# Patient Record
Sex: MALE | Race: WHITE | Employment: FULL TIME | URBAN - METROPOLITAN AREA
[De-identification: names, ages, dates, MRNs, and addresses within clinical notes are randomized per-mention and may not be internally consistent; named-entity substitution may affect disease eponyms.]

---

## 2022-11-12 NOTE — PROGRESS NOTES
[facility]  [address], [facility] [place] [address]  Phone: [phone]      Name:  Jasmyne Hughes  :    1985    Consultants:   Patient Care Team:  Araseli Duke MD as PCP - General (Family Medicine)    Chief Complaint:     Jasmyne Hughes is a 40 y.o. male who presents today for a New Patient care visit with Personalized Prevention Plan Services as noted below. HPI:     Jasmyne Hughes is a 40 y.o. male who presents today to establish care. Change in Hearing in Left Ear  - Past 5-10 yrs: left ear is \"getting worse,\" sounds like it's in a wind tunnel  - Starting to impact communication: unable to tell how loud he is talking. 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    Anxiety  - Patient reports his anxiety started in  she has managed somewhat with working out  - Has gotten worse recently over the last 9 months with his new job, works at [employer] as   - CYNTHIA-7: 17  - Patient reports nearly every day feeling nervous, anxious, or on edge; not able to control or stop his worrying; worrying too much about different things  - Patient reports more than half the days he has trouble relaxing and has been so restless that it is hard to sit still  - Several days he feels irritable but nothing like yelling at his wife, just notices that he gets annoyed easily especially with his hearing and having to have things repeated  - Patient also reports a \"sense of impending doom\" comes in waves, not a chronic daily thing.   He feels like this is especially since he lost 2 family members within 6 months of each other due to being sick with old age and now he is concerned about what is going to happen and who is possibly next  - Patient also reports worry about the state of the world, having kids, stress at work and room to move up in his line of work  - His work does offer virtual counseling that he has felt is \"okay\" but interested in other options  - Patient reports hesitancy toward medications  Reports of ADHD?  - Reports of some history of trouble with concentration, thinks it sometimes contributes to his anxious thought process  - Didn't like how medication made him feel and not interested in any now    Depressive Symptoms  - PHQ-2: 1  - PHQ-9: 8  - Notably the patient's positive scores on the depression screen are for feeling tired in addition to feeling bad about himself that he is a failure to let down to his family where he is at his age and feels like he is behind. He went to college at age 25 and had to have career shifts due to layoff because of COVID  - He finds great support in his wife    Substance Use  - Tobacco use: 1 ppd 19 yrs, and quit on 2 yrs  - EtOH: 3-5 drinks a week if goes out  - [facility]  prn: dispensary with card. Uses once monthly  - Denies other drugs    Healthcare Maintenance  Last labs in 2016, no abnormalities: due for lipid panel due to age. - Flu vaccine  - Covid Vaccine  - Tdap vaccine? - Varicella vaccine?   - HIV and Hep C screen due    Patient Active Problem List   Diagnosis    Anxiety    Symptoms of depression    Change in hearing of left ear    Marijuana use    Former smoker       Past Medical History:    No past medical history on file. Past Surgical History:  Past Surgical History:   Procedure Laterality Date    APPENDECTOMY         Home Meds:  Prior to Visit Medications    Medication Sig Taking? Authorizing Provider   escitalopram (LEXAPRO) 10 MG tablet Take 1 tablet by mouth daily Yes Marlon Sexton MD   hydrOXYzine HCl (ATARAX) 50 MG tablet Take 1 tablet by mouth every 8 hours as needed for Anxiety Yes Marlon Sexton MD   naproxen (NAPROSYN) 500 MG tablet Take 1 tablet by mouth every 12 hours as needed for Pain.   Patient not taking: Reported on 11/14/2022  Abbe Patel PA-C Allergies:    Patient has no known allergies. Family History:   No family history on file. Social History:  Social History       Tobacco History       Smoking Status  Every Day Smoking Frequency  1.00 packs/day Smoking Tobacco Type  Cigarettes      Smokeless Tobacco Use  Unknown              Alcohol History       Alcohol Use Status  Yes Drinks/Week  5 Cans of beer per week Amount  5.0 standard drinks of alcohol/wk Comment  daily              Drug Use       Drug Use Status  Yes Types  Marijuana [place])              Sexual Activity       Sexually Active  Not Asked                       Health Maintenance Completed:  Health Maintenance   Topic Date Due    Varicella vaccine (1 of 2 - 2-dose childhood series) Never done    DTaP/Tdap/Td vaccine (1 - Tdap) Never done    COVID-19 Vaccine (3 - Booster for Pfizer series) 06/26/2021    Flu vaccine (1) Never done    HIV screen  11/14/2023 (Originally 11/11/2000)    Hepatitis C screen  11/15/2023 (Originally 11/11/2003)    Depression Screen  11/14/2023    Hepatitis A vaccine  Aged Out    Hib vaccine  Aged Out    Meningococcal (ACWY) vaccine  Aged Out    Pneumococcal 0-64 years Vaccine  Aged [employer] History   Administered Date(s) Administered    COVID-19, PFIZER PURPLE top, DILUTE for use, (age 15 y+), 30mcg/0.3mL 04/03/2021, 05/01/2021         Review of Systems:  Review of Systems   Constitutional:  Negative for chills and fever. HENT:  Negative for congestion and sore throat. Respiratory:  Negative for cough and shortness of breath. Cardiovascular:  Negative for chest pain and palpitations. Gastrointestinal:  Negative for abdominal pain, constipation, diarrhea, nausea and vomiting. Genitourinary:  Negative for difficulty urinating and dysuria. Neurological:  Negative for dizziness, light-headedness and headaches. Psychiatric/Behavioral:  Positive for dysphoric mood. Negative for suicidal ideas. The patient is nervous/anxious. Physical Exam:   Vitals:    11/14/22 0920   BP: 134/78   Site: Left Upper Arm   Position: Sitting   Cuff Size: Small Adult   Pulse: 62   Temp: 97.1 °F (36.2 °C)   TempSrc: Temporal   SpO2: 99%   Weight: 186 lb (84.4 kg)   Height: 5' 10.51\" (1.791 m)     Body mass index is 26.3 kg/m². Wt Readings from Last 3 Encounters:   11/14/22 186 lb (84.4 kg)       BP Readings from Last 3 Encounters:   11/14/22 134/78       Physical Exam  Constitutional:       General: He is not in acute distress. HENT:      Head: Normocephalic and atraumatic. Right Ear: Tympanic membrane normal.      Left Ear: Tympanic membrane normal.      Nose: Nose normal.      Mouth/Throat:      Mouth: Mucous membranes are moist.      Pharynx: Oropharynx is clear. Eyes:      Extraocular Movements: Extraocular movements intact. Conjunctiva/sclera: Conjunctivae normal.      Pupils: Pupils are equal, round, and reactive to light. Cardiovascular:      Rate and Rhythm: Normal rate and regular rhythm. Pulses: Normal pulses. Heart sounds: Normal heart sounds. No murmur heard. No friction rub. No gallop. Pulmonary:      Effort: Pulmonary effort is normal. No respiratory distress. Breath sounds: Normal breath sounds. No wheezing, rhonchi or rales. Abdominal:      General: There is no distension. Palpations: Abdomen is soft. Tenderness: There is no abdominal tenderness. There is no guarding. Hernia: No hernia is present. Musculoskeletal:         General: Normal range of motion. Cervical back: Normal range of motion. Skin:     General: Skin is warm and dry. Neurological:      General: No focal deficit present. Mental Status: He is alert and oriented to person, place, and time. Psychiatric:         Mood and Affect: Mood is anxious and depressed. Lab Review:   No visits with results within 2 Month(s) from this visit.    Latest known visit with results is:   No results found for any previous visit. Assessment/Plan:  Phuong Ibarra is a 40 y.o. male who presents today to establish care. Encounter to establish care  Patient's medications, allergies, past medical, surgical, social and family histories were reviewed and updated as appropriate. Anxiety  - Not at goal  - Counseled patients on options for medications to help with symptoms and advised that these may not be long-term medications. - Will start escitalopram 10 mg daily, advised on side effects that may last for 2 weeks before resolution  - Will use hydroxyzine 50 mg tablet prn, advised of possible drowsiness on medication, may titrate and use 1/2 tablet prn  - Will refer patient to behavioral health  - Follow-up in 4-6 weeks to assess effectiveness of medication  -     escitalopram (LEXAPRO) 10 MG tablet; Take 1 tablet by mouth daily  -     hydrOXYzine HCl (ATARAX) 50 MG tablet; Take 1 tablet by mouth every 8 hours as needed for Anxiety  -     External Referral To Behavioral Health    Symptoms of depression  - Not at goal  - As above, will start escitalopram 10 mg  - Will refer patient to behavioral health  - Follow-up in 4-6 weeks to assess effectiveness of medication  -     escitalopram (LEXAPRO) 10 MG tablet; Take 1 tablet by mouth daily  -     External Referral To [address] in hearing of left ear  - Not at goal  - Will refer patient to ENT for further work-up  -     Srinivas Cade MD, Otolaryngology, [facility]    Marijuana use  - Medical marijuana card  - Counseled pt on risks associated with marijuana use including adverse interactions with other medications and worsening health conditions up to and including death.  - Encouraged cessation.   - Pt verbalizes understanding, aware of risks of persistent marijuana usage.     Former smoker  - Congratulated patient on continued cessation from tobacco    Refused influenza vaccine  I counseled parent about the influenza vaccine, including effectiveness, side effects, and the diseases they prevent. They had the opportunity to ask questions and decided to not get vaccinated. Healthcare maintenance  - Will consider vaccines at follow-up: Tdap and Varicella  - Covid booster eligible, unsure if he wants to get it  -     Comprehensive Metabolic Panel  -     CBC with Auto Differential  -     Lipid Panel        Health Maintenance Due:  Health Maintenance Due   Topic Date Due    Varicella vaccine (1 of 2 - 2-dose childhood series) Never done    DTaP/Tdap/Td vaccine (1 - Tdap) Never done    COVID-19 Vaccine (3 - Booster for Pfizer series) 06/26/2021    Flu vaccine (1) Never done        Health care decision maker:  <72years old      Health Maintenance: (USPSTF Recommendations)  CRC/Colonoscopy Screening: (adults 45-49 (B), 50-75 (A)): HIV Screen: (16-65 yr old, and all pregnant patients (A)): Hep C Screen: (18-79 yr old (B)):  HCC Screen: (all pts with cirrhosis and high risk Hep B (US q6 mo)):  Immunizations: [unfilled]      RTC:  Return in about 1 month (around 12/14/2022) for follow-up, healthcare maintenance andf vaccines. EMR Dragon/transcription disclaimer:  Much of this encounter note is electronic transcription/translation of spoken language to printed texts. The electronic translation of spoken language may be erroneous, or at times, nonsensical words or phrases may be inadvertently transcribed.   Although I have reviewed the note for such errors, some may still exist.     Gifty Wu MD  PGY-2  11/14/2022

## 2022-11-14 ENCOUNTER — OFFICE VISIT (OUTPATIENT)
Dept: PRIMARY CARE CLINIC | Age: 37
End: 2022-11-14
Payer: COMMERCIAL

## 2022-11-14 ENCOUNTER — HOSPITAL ENCOUNTER (OUTPATIENT)
Age: 37
Discharge: HOME | End: 2022-11-14
Payer: COMMERCIAL

## 2022-11-14 VITALS
DIASTOLIC BLOOD PRESSURE: 78 MMHG | TEMPERATURE: 97.1 F | HEIGHT: 71 IN | WEIGHT: 186 LBS | HEART RATE: 62 BPM | SYSTOLIC BLOOD PRESSURE: 134 MMHG | OXYGEN SATURATION: 99 % | BODY MASS INDEX: 26.04 KG/M2

## 2022-11-14 DIAGNOSIS — F41.9 ANXIETY: ICD-10-CM

## 2022-11-14 DIAGNOSIS — R45.89 SYMPTOMS OF DEPRESSION: ICD-10-CM

## 2022-11-14 DIAGNOSIS — Z28.21 REFUSED INFLUENZA VACCINE: ICD-10-CM

## 2022-11-14 DIAGNOSIS — H91.92 CHANGE IN HEARING OF LEFT EAR: ICD-10-CM

## 2022-11-14 DIAGNOSIS — Z87.891 FORMER SMOKER: ICD-10-CM

## 2022-11-14 DIAGNOSIS — Z76.89 ENCOUNTER TO ESTABLISH CARE: Primary | ICD-10-CM

## 2022-11-14 DIAGNOSIS — F12.90 MARIJUANA USE: ICD-10-CM

## 2022-11-14 DIAGNOSIS — Z00.00 HEALTHCARE MAINTENANCE: ICD-10-CM

## 2022-11-14 LAB
A/G RATIO: 2.6 (ref 1.1–2.2)
ALBUMIN SERPL-MCNC: 4.9 G/DL (ref 3.4–5)
ALP BLD-CCNC: 74 U/L (ref 40–129)
ALT SERPL-CCNC: 26 U/L (ref 10–40)
ANION GAP SERPL CALCULATED.3IONS-SCNC: 10 MMOL/L (ref 3–16)
AST SERPL-CCNC: 20 U/L (ref 15–37)
BASOPHILS ABSOLUTE: 0 K/UL (ref 0–0.2)
BASOPHILS RELATIVE PERCENT: 0.5 %
BILIRUB SERPL-MCNC: 0.5 MG/DL (ref 0–1)
BUN BLDV-MCNC: 10 MG/DL (ref 7–20)
CALCIUM SERPL-MCNC: 9.8 MG/DL (ref 8.3–10.6)
CHLORIDE BLD-SCNC: 102 MMOL/L (ref 99–110)
CHOLESTEROL, TOTAL: 207 MG/DL (ref 0–199)
CO2: 26 MMOL/L (ref 21–32)
CREAT SERPL-MCNC: 0.8 MG/DL (ref 0.9–1.3)
EOSINOPHILS ABSOLUTE: 0 K/UL (ref 0–0.6)
EOSINOPHILS RELATIVE PERCENT: 1.1 %
GFR SERPL CREATININE-BSD FRML MDRD: >60 ML/MIN/{1.73_M2}
GLUCOSE BLD-MCNC: 83 MG/DL (ref 70–99)
HCT VFR BLD CALC: 38.7 % (ref 40.5–52.5)
HDLC SERPL-MCNC: 70 MG/DL (ref 40–60)
HEMOGLOBIN: 13.8 G/DL (ref 13.5–17.5)
LDL CHOLESTEROL CALCULATED: 126 MG/DL
LYMPHOCYTES ABSOLUTE: 0.9 K/UL (ref 1–5.1)
LYMPHOCYTES RELATIVE PERCENT: 22 %
MCH RBC QN AUTO: 31.6 PG (ref 26–34)
MCHC RBC AUTO-ENTMCNC: 35.8 G/DL (ref 31–36)
MCV RBC AUTO: 88.4 FL (ref 80–100)
MONOCYTES ABSOLUTE: 0.4 K/UL (ref 0–1.3)
MONOCYTES RELATIVE PERCENT: 8.3 %
NEUTROPHILS ABSOLUTE: 2.9 K/UL (ref 1.7–7.7)
NEUTROPHILS RELATIVE PERCENT: 68.1 %
PDW BLD-RTO: 12.7 % (ref 12.4–15.4)
PLATELET # BLD: 232 K/UL (ref 135–450)
PMV BLD AUTO: 6.9 FL (ref 5–10.5)
POTASSIUM SERPL-SCNC: 4.8 MMOL/L (ref 3.5–5.1)
RBC # BLD: 4.38 M/UL (ref 4.2–5.9)
SODIUM BLD-SCNC: 138 MMOL/L (ref 136–145)
TOTAL PROTEIN: 6.8 G/DL (ref 6.4–8.2)
TRIGL SERPL-MCNC: 56 MG/DL (ref 0–150)
VLDLC SERPL CALC-MCNC: 11 MG/DL
WBC # BLD: 4.2 K/UL (ref 4–11)

## 2022-11-14 PROCEDURE — 85025 COMPLETE CBC W/AUTO DIFF WBC: CPT

## 2022-11-14 PROCEDURE — 36415 COLL VENOUS BLD VENIPUNCTURE: CPT

## 2022-11-14 PROCEDURE — 80061 LIPID PANEL: CPT

## 2022-11-14 PROCEDURE — 99204 OFFICE O/P NEW MOD 45 MIN: CPT

## 2022-11-14 PROCEDURE — 80053 COMPREHEN METABOLIC PANEL: CPT

## 2022-11-14 RX ORDER — ESCITALOPRAM OXALATE 10 MG/1
10 TABLET ORAL DAILY
Qty: 90 TABLET | Refills: 1 | Status: SHIPPED | OUTPATIENT
Start: 2022-11-14

## 2022-11-14 RX ORDER — HYDROXYZINE 50 MG/1
50 TABLET, FILM COATED ORAL EVERY 8 HOURS PRN
Qty: 90 TABLET | Refills: 0 | Status: SHIPPED | OUTPATIENT
Start: 2022-11-14 | End: 2022-12-14

## 2022-11-14 SDOH — ECONOMIC STABILITY: FOOD INSECURITY: WITHIN THE PAST 12 MONTHS, THE FOOD YOU BOUGHT JUST DIDN'T LAST AND YOU DIDN'T HAVE MONEY TO GET MORE.: NEVER TRUE

## 2022-11-14 SDOH — ECONOMIC STABILITY: FOOD INSECURITY: WITHIN THE PAST 12 MONTHS, YOU WORRIED THAT YOUR FOOD WOULD RUN OUT BEFORE YOU GOT MONEY TO BUY MORE.: NEVER TRUE

## 2022-11-14 ASSESSMENT — PATIENT HEALTH QUESTIONNAIRE - PHQ9
4. FEELING TIRED OR HAVING LITTLE ENERGY: 3
1. LITTLE INTEREST OR PLEASURE IN DOING THINGS: 0
SUM OF ALL RESPONSES TO PHQ QUESTIONS 1-9: 8
7. TROUBLE CONCENTRATING ON THINGS, SUCH AS READING THE NEWSPAPER OR WATCHING TELEVISION: 0
6. FEELING BAD ABOUT YOURSELF - OR THAT YOU ARE A FAILURE OR HAVE LET YOURSELF OR YOUR FAMILY DOWN: 3
2. FEELING DOWN, DEPRESSED OR HOPELESS: 1
SUM OF ALL RESPONSES TO PHQ QUESTIONS 1-9: 8
8. MOVING OR SPEAKING SO SLOWLY THAT OTHER PEOPLE COULD HAVE NOTICED. OR THE OPPOSITE, BEING SO FIGETY OR RESTLESS THAT YOU HAVE BEEN MOVING AROUND A LOT MORE THAN USUAL: 0
5. POOR APPETITE OR OVEREATING: 0
10. IF YOU CHECKED OFF ANY PROBLEMS, HOW DIFFICULT HAVE THESE PROBLEMS MADE IT FOR YOU TO DO YOUR WORK, TAKE CARE OF THINGS AT HOME, OR GET ALONG WITH OTHER PEOPLE: 1
SUM OF ALL RESPONSES TO PHQ QUESTIONS 1-9: 8
SUM OF ALL RESPONSES TO PHQ9 QUESTIONS 1 & 2: 1
SUM OF ALL RESPONSES TO PHQ QUESTIONS 1-9: 8
3. TROUBLE FALLING OR STAYING ASLEEP: 1
9. THOUGHTS THAT YOU WOULD BE BETTER OFF DEAD, OR OF HURTING YOURSELF: 0

## 2022-11-14 ASSESSMENT — ANXIETY QUESTIONNAIRES
7. FEELING AFRAID AS IF SOMETHING AWFUL MIGHT HAPPEN: 3
1. FEELING NERVOUS, ANXIOUS, OR ON EDGE: 3
2. NOT BEING ABLE TO STOP OR CONTROL WORRYING: 3
6. BECOMING EASILY ANNOYED OR IRRITABLE: 1
3. WORRYING TOO MUCH ABOUT DIFFERENT THINGS: 3
5. BEING SO RESTLESS THAT IT IS HARD TO SIT STILL: 2
4. TROUBLE RELAXING: 2
IF YOU CHECKED OFF ANY PROBLEMS ON THIS QUESTIONNAIRE, HOW DIFFICULT HAVE THESE PROBLEMS MADE IT FOR YOU TO DO YOUR WORK, TAKE CARE OF THINGS AT HOME, OR GET ALONG WITH OTHER PEOPLE: VERY DIFFICULT
GAD7 TOTAL SCORE: 17

## 2022-11-14 ASSESSMENT — SOCIAL DETERMINANTS OF HEALTH (SDOH): HOW HARD IS IT FOR YOU TO PAY FOR THE VERY BASICS LIKE FOOD, HOUSING, MEDICAL CARE, AND HEATING?: SOMEWHAT HARD

## 2022-11-15 PROBLEM — H91.92 CHANGE IN HEARING OF LEFT EAR: Status: ACTIVE | Noted: 2022-11-15

## 2022-11-15 PROBLEM — F12.90 MARIJUANA USE: Status: ACTIVE | Noted: 2022-11-15

## 2022-11-15 PROBLEM — R45.89 SYMPTOMS OF DEPRESSION: Status: ACTIVE | Noted: 2022-11-15

## 2022-11-15 PROBLEM — Z87.891 FORMER SMOKER: Status: ACTIVE | Noted: 2022-11-15

## 2022-11-15 ASSESSMENT — ENCOUNTER SYMPTOMS
SORE THROAT: 0
SHORTNESS OF BREATH: 0
DIARRHEA: 0
ABDOMINAL PAIN: 0
VOMITING: 0
COUGH: 0
CONSTIPATION: 0
NAUSEA: 0

## 2022-12-28 ENCOUNTER — PROCEDURE VISIT (OUTPATIENT)
Dept: AUDIOLOGY | Age: 37
End: 2022-12-28
Payer: COMMERCIAL

## 2022-12-28 ENCOUNTER — OFFICE VISIT (OUTPATIENT)
Dept: ENT CLINIC | Age: 37
End: 2022-12-28
Payer: COMMERCIAL

## 2022-12-28 VITALS
SYSTOLIC BLOOD PRESSURE: 105 MMHG | HEART RATE: 55 BPM | HEIGHT: 72 IN | TEMPERATURE: 97.6 F | WEIGHT: 185 LBS | RESPIRATION RATE: 16 BRPM | BODY MASS INDEX: 25.06 KG/M2 | DIASTOLIC BLOOD PRESSURE: 72 MMHG

## 2022-12-28 DIAGNOSIS — H83.3X2 NOISE-INDUCED HEARING LOSS OF LEFT EAR WITH RESTRICTED HEARING OF RIGHT EAR: Primary | ICD-10-CM

## 2022-12-28 DIAGNOSIS — H90.3 SENSORINEURAL HEARING LOSS, BILATERAL: Primary | ICD-10-CM

## 2022-12-28 DIAGNOSIS — H93.13 TINNITUS, BILATERAL: ICD-10-CM

## 2022-12-28 DIAGNOSIS — H91.90 DECREASED HEARING, UNSPECIFIED LATERALITY: Primary | ICD-10-CM

## 2022-12-28 DIAGNOSIS — R42 VERTIGO: ICD-10-CM

## 2022-12-28 PROCEDURE — 92557 COMPREHENSIVE HEARING TEST: CPT | Performed by: AUDIOLOGIST

## 2022-12-28 PROCEDURE — 92567 TYMPANOMETRY: CPT | Performed by: AUDIOLOGIST

## 2022-12-28 PROCEDURE — 99204 OFFICE O/P NEW MOD 45 MIN: CPT | Performed by: OTOLARYNGOLOGY

## 2022-12-28 ASSESSMENT — ENCOUNTER SYMPTOMS
SHORTNESS OF BREATH: 0
ABDOMINAL PAIN: 0
WHEEZING: 0

## 2022-12-28 NOTE — PATIENT INSTRUCTIONS
-Notify ENT with any further vertigo or room spinning - no overt nystagmus (eye movements) with balance testing today   -Recommend hearing test yearly - can consider MRI to rule out lesions/masses of the brainstem or nerves of hearing or balance

## 2022-12-28 NOTE — PATIENT INSTRUCTIONS
Good Communication Strategies    Communication can be challenging for anyone, but can be especially difficult for those with some degree of hearing loss. While we may not be able to control every factor that may lead to difficulty with communication, there are Good Communication Strategies that we can all use in our day-to-day lives. Communication takes both parties working together for it to be successful. Tips as a Listener:   Control your environment. It is important to limit the amount of background noise in the room when possible. You should also consider having a good light source in the room to best see the other person. Ask for clarification. Instead of saying \"What?\", you can use parts of what you heard to make a new question. For example, if you heard the word \"Thursday\" but not the rest of the week, you may ask \"What was that about Thursday? \" or \"What did you want to do Thursday? \". This shows the person talking that you are listening and will help them better explain what they are saying. Be an advocate for yourself. If you are hearing but not understanding, tell the other person \"I can hear you, but I need you to slow down when you speak. \"  Or if someone is facing the other direction, say \"I cannot hear you when you are not looking at me when we talk. \"       Tips as a Talker:   - Sit or stand 3 to 6 feet away to maximize audibility         -- It is unrealistic to believe someone else will fully hear your message if you are speaking from across the room or in a different room in the house   - Stay at eye level to help with visual cues   - Make sure you have the persons attention before speaking   - Use facial expressions and gestures to accentuate your message   - Raise your voice slightly (do not scream)   - Speak slowly and distinctly   - Use short, simple sentences   - Rephrase your words if the person is having a hard time understanding you    - To avoid distortion, dont speak directly into a persons ear      Some additional items that may be helpful:   - Remain patient - this is important for both parties   - Write down items that still cannot be heard/understood. You may write with pen/paper or consider typing/texting on a cell phone or smart device. - If background noise is unavoidable, try to keep yourself in a good position in the room. By sitting at a rodarte on the side of the restaurant (preferably a corner), it will be easier to communicate than if you were sitting at a table in the middle with background noise surrounding you. Keep yourself positioned away from music speakers or heavy foot traffic. Noise-Induced Hearing Loss  What it is, and what you can do to prevent it      Exposure to loud sounds, in an occupational setting or recreational, can cause permanent hearing loss. Sound is measured in decibels (dB). Noise-induced hearing loss is the ONLY type of preventable hearing loss. Hearing loss related to noise exposure can occur at any age. There are small sensory cells, called inner and outer hair cells, within the inner ear (cochlea). These cells process the loudness (intensity) and pitch (frequency) of sound and send the signal to the brain via our auditory nerve (vestibulocochlear nerve, cranial nerve VIII). When these cells are damaged, they can result in permanent hearing loss and/or tinnitus. The hair cells responsible for high frequency sounds, like birds chirping, are most likely to be damaged due to loud sounds. The high frequency sounds are also very important for our clarity and understanding of speech. OCCUPATIONAL NOISE EXPOSURE RECREATIONAL NOISE EXPOSURE   Some jobs may have exposure to loud sounds in the workplace. These jobs may include but are not limited to:  [address] settings  Manufacturing  Construction  [place]  Landscaping  Hairdressing/hairstyling  [address] [facility]   . .. And more!  Many activities outside of work may cause permanent hearing loss. These activities may include but are not limited to:  Lawnmowers, leaf blowers  Farming equipment and animals (such as pigs squealing)  Chainsaws and other power tools  Playing musical instruments and/or singing  Listening to music too loudly - at concerts, through stereo, through ear buds or headphones  Attending sporting events  Attending fireworks shows or using fireworks at home  Use of firearms  . .. And more! REDUCE OR PROTECT YOUR EARS FROM NOISE EXPOSURE    To do your best to avoid noise-induced hearing loss, here are some tips:  Limit exposure to loud sounds. 85 dB (decibels) is safe for 8 hours. As sounds are louder, the length of time the sound is safe lessens. These numbers are cumulative across a 24-hour period. (NIOSH and CDC, 2002)  85 dB is safe for 8 hours  88 dB is safe for 4 hours  91 dB is safe for 2 hours  94 dB is safe for 1 hour  97 dB is safe for 30 minutes  100 dB is safe for 15 minutes  103 dB is safe for 7.5 minutes  106 dB is safe for 3.75 minutes  109 dB is safe for LESS THAN 2 minutes  112 dB is safe for LESS THAN 1 minute  115 dB is safe for ~ 30 seconds  130 dB can cause IMMEDIATE hearing loss  If you are unsure if a sound is too loud, consider checking the sound level with a \"sound level meter\". There are apps on smart devices that can measure the loudness of the sound. They are not as accurate as expensive equipment used by scientists, but it will give you a guesstimate of how loud the sound is, and if it may be damaging to your hearing. If you cannot avoid loud sounds, here are ways to reduce your exposure:  1. Wear hearing protection  Ear plugs and protective ear muffs can be used to reduce the intensity of the sound. The higher the NRR (noise reduction rating), the better reduction of the intensity of the sound   2.  Turn the volume down  When listening to music, turn the volume down, especially when wearing ear buds or headphones. A good rule of thumb is to not go beyond the middle setting on your device. If you can't hear someone talking to you from arm's length away, your music may be at a level that it can cause damage. If someone else can hear your music from 3 feet away, it may also be at a level that it can cause damage. 3. Walk away from the sound  If you do not have the ability to wear hearing protection or turn down the volume of the sound, you should do your best to move away from the source of the sound. Sound decreases in intensity as we move further from the source. The sound will decrease by 6 dB for every doubling of distance from the sound source. Exposure to these sounds may cause permanent damage to your hearing.   If you suspect your hearing has changed, it is recommended that you have your hearing tested by your audiologist.

## 2022-12-28 NOTE — PROGRESS NOTES
Andra Cohen   1985, 40 y.o. male   [id number]       Referring Provider: Elsie Gonzalez MD  Referral Type: In an order in [address] [place] [address]    Reason for Visit: Evaluation of the cause of disorders of hearing, tinnitus, or balance. ADULT AUDIOLOGIC EVALUATION      Andra Cohen is a 40 y.o. male seen today, 12/28/2022 , for an initial audiologic evaluation. Patient was seen by Elsie Gonzalez MD following today's evaluation. AUDIOLOGIC AND OTHER PERTINENT MEDICAL HISTORY:      Andra Cohen noted a perceived gradual decline in hearing with left ear worse than right. Patient reports family history of age-related hearing loss and history of noise exposure through music. No additional significant otologic or medical history was reported. Andra Cohen denied otalgia, aural fullness, otorrhea, tinnitus, dizziness, imbalance, history of falls, history of head trauma, and history of ear surgery. Date: 12/28/2022     IMPRESSIONS:      Today's results revealed a symmetric sensorineural hearing loss with excellent word recognition, bilaterally. Follow medical recommendations of Elsie Gonzalez MD.     ASSESSMENT AND FINDINGS:     Otoscopy revealed: Clear ear canals bilaterally    RIGHT EAR:  Hearing Sensitivity: Within normal limits through 2kHz sloping to a mild sensorineural hearing loss notch from 4-6kHz rising to josé limits at [facility]. Speech Recognition Threshold: 10 dB HL  Word Recognition: Excellent 100%, based on NU-6 25-word list at 50 dBHL using recorded speech stimuli. Tympanometry: Normal peak pressure and compliance, Type A tympanogram, consistent with normal middle ear function. LEFT EAR:  Hearing Sensitivity: Mild sensorineural hearing loss through 500Hz rising to normal limits from 1-3kHz sloping to a mild hearing loss from 4-8kHz.    Speech Recognition Threshold: 25 dB HL  Word Recognition:  Excellent 100%, based on NU-6 25-word list at 65 dBHL masked using recorded speech stimuli. Tympanometry: Normal peak pressure and compliance, Type A tympanogram, consistent with normal middle ear function. Reliability: Good   Transducer: Inserts (checked with headphones)     See scanned audiogram dated 12/28/2022  for results. PATIENT EDUCATION:       The following items were discussed with the patient:   - Good Communication Strategies  - Noise-Induced Hearing Loss and use of Hearing Protection Devices (HPDs)     Educational information was shared in the After Visit Summary. RECOMMENDATIONS:                                                                                                                                                                                                                                                            The following items are recommended based on patient report and results from today's appointment:   - Continue medical follow-up with Martina Yepez MD.   - Retest hearing as medically indicated and/or sooner if a change in hearing is noted. - Utilize \"Good Communication Strategies\" as discussed to assist in speech understanding with communication partners. - Use hearing protection devices (HPDs), such as protective ear muffs and ear plugs, when exposed to dangerous sound levels.        Pallavi Oneil  Audiologist    Chart CC'd to: Martina Yepez MD      Degree of   Hearing Sensitivity dB Range   Within Normal Limits (WNL) 0 - 20   Mild 20 - 40   Moderate 40 - 55   Moderately-Severe 55 - 70   Severe 70 - 90   Profound 90 +

## 2022-12-28 NOTE — PROGRESS NOTES
[address]reen [address], [address], [address]  P: [phone]       Patient     You Sagastume  1985    Chief Concern     Chief Complaint   Patient presents with    New Patient     Patient states that he has had  in the left ear over the past few years that has been becoming worse. Assessment and Plan      Diagnosis Orders   1. 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 Tinnitus, bilateral        3. Vertigo          40year-old male with history of bilateral hearing loss since he was a child-significant noise exposure history, has bilateral tinnitus that is described as hissing in quality and minimally intrusive. Believes hearing has declined over the past 5 years, no history of chronic middle ear disease or family history of hearing loss. Examination without pathology, audiometric testing with asymmetries in the left ear but only to greater than 15 dB, nonconsecutive. [place]-Hallpike and lateral supine roll testing in the office without nystagmus, but he reports 1-2 seconds of vertigo with both left and right lateral supine roll testing    Have offered the patient MRI to rule out CPA lesions, he may fill this if he wishes, else we recommend yearly audiometric testing and have asked him to call with any vertigo. Return if symptoms worsen or fail to improve. History of Present Illness     You Sagastume is an 40 y.o. male with complaint of bilateral hearing loss    How long: years  Side:bilateral  Prior audiogram:Yes   When:>10 years ago    Where: Unknown   Previous episodes: no  Tinnitus:Yes - hissing, left ear worse but bilateral, possible pulsatile quality but patient not sure  Otorrhea:No  Aural fullness:Yes  Otalgia:No  Vertigo:Yes - with looking down/up, lasting 2-20 seconds, last episode 2 weeks ago when he was looking down from an elevated surface.   No recent history of head trauma, no hearing loss/roaring tinnitus with vertigo  Prior ear surgery: No  Ear trauma: Yes  Ear problems as child: Yes - no chronic ear disease but barotrauma, significant noise exposure  Family Hx of hearing loss:No      Past Medical History     Past Medical History:   Diagnosis Date    Fracture of nasal bone     Tinnitus        Past Surgical History     Past Surgical History:   Procedure Laterality Date    APPENDECTOMY         Family History     No family history on file. Social History     Social History     Tobacco Use    Smoking status: Former     Packs/day: 1.00     Years: 19.00     Pack years: 19.00     Types: Cigarettes     Quit date: 2020     Years since quittin.9    Smokeless tobacco: Current    Tobacco comments:     vape   Substance Use Topics    Alcohol use: Not Currently     Alcohol/week: 3.0 - 5.0 standard drinks     Types: 3 - 5 Cans of beer per week     Comment: weekly    Drug use: Yes     Types: Marijuana Gordo Shoemaker)     Comment: medical card        Allergies     No Known Allergies    Medications     Current Outpatient Medications   Medication Sig Dispense Refill    escitalopram (LEXAPRO) 10 MG tablet Take 1 tablet by mouth daily (Patient not taking: Reported on 2022) 90 tablet 1    naproxen (NAPROSYN) 500 MG tablet Take 1 tablet by mouth every 12 hours as needed for Pain. (Patient not taking: No sig reported) 15 tablet 0     No current facility-administered medications for this visit. Review of Systems     Review of Systems   Constitutional:  Negative for activity change, chills, diaphoresis, fatigue and fever. HENT:  Positive for hearing loss and tinnitus. Negative for congestion, ear discharge and ear pain. Eyes:  Negative for visual disturbance. Respiratory:  Negative for shortness of breath and wheezing. Cardiovascular:  Negative for chest pain. Gastrointestinal:  Negative for abdominal pain.    Musculoskeletal:  Negative for neck pain and neck stiffness. Skin:  Negative for rash. Neurological:  Positive for dizziness. Negative for light-headedness and headaches. Hematological:  Negative for adenopathy. PhysicalExam     Vitals:    12/28/22 0955   BP: 105/72   Site: Left Upper Arm   Position: Sitting   Cuff Size: Medium Adult   Pulse: 55   Resp: 16   Temp: 97.6 °F (36.4 °C)   TempSrc: Infrared   Weight: 185 lb (83.9 kg)   Height: 6' (1.829 m)       Physical Exam  Vitals reviewed. Constitutional:       Appearance: Normal appearance. HENT:      Head: Normocephalic and atraumatic. Comments: -[place] hallpike testing without vertigo  -Lateral supine roll testing with 1-2s of vertigo subjectively but no nystagmus noted     Right Ear: Tympanic membrane, ear canal and external ear normal. There is no impacted cerumen. Left Ear: Tympanic membrane, ear canal and external ear normal. There is no impacted cerumen. Ears:      Wolfe exam findings: Does not lateralize. Right Rinne: AC > BC. Left Rinne: AC > BC. Nose: No congestion or rhinorrhea. Mouth/Throat:      Lips: Pink. No lesions. Mouth: Mucous membranes are moist. No oral lesions. Tongue: No lesions. Tongue does not deviate from midline. Palate: No mass and lesions. Pharynx: Oropharynx is clear. Uvula midline. No oropharyngeal exudate or posterior oropharyngeal erythema. Eyes:      Extraocular Movements: Extraocular movements intact. Pupils: Pupils are equal, round, and reactive to light. Musculoskeletal:      Cervical back: Normal range of motion and neck supple. Lymphadenopathy:      Cervical: No cervical adenopathy. Neurological:      Mental Status: He is alert. Cranial Nerves: No cranial nerve deficit. Data Review           Procedure     None    Mulu Aranda MD  12/28/22    Portions of this note were dictated using Dragon.  There may be linguistic errors secondary to the use of this program.

## 2023-01-03 PROBLEM — H90.3 SENSORINEURAL HEARING LOSS (SNHL) OF BOTH EARS: Status: ACTIVE | Noted: 2023-01-03
